# Patient Record
Sex: FEMALE | Race: WHITE | Employment: FULL TIME | URBAN - METROPOLITAN AREA
[De-identification: names, ages, dates, MRNs, and addresses within clinical notes are randomized per-mention and may not be internally consistent; named-entity substitution may affect disease eponyms.]

---

## 2023-09-15 ENCOUNTER — OFFICE VISIT (OUTPATIENT)
Dept: PRIMARY CARE CLINIC | Age: 28
End: 2023-09-15
Payer: COMMERCIAL

## 2023-09-15 VITALS
TEMPERATURE: 97.3 F | BODY MASS INDEX: 47.09 KG/M2 | WEIGHT: 293 LBS | HEIGHT: 66 IN | HEART RATE: 61 BPM | DIASTOLIC BLOOD PRESSURE: 84 MMHG | SYSTOLIC BLOOD PRESSURE: 132 MMHG | OXYGEN SATURATION: 99 %

## 2023-09-15 DIAGNOSIS — E66.01 MORBID OBESITY WITH BMI OF 45.0-49.9, ADULT (HCC): ICD-10-CM

## 2023-09-15 DIAGNOSIS — F40.10 SOCIAL ANXIETY DISORDER: ICD-10-CM

## 2023-09-15 DIAGNOSIS — F43.10 PTSD (POST-TRAUMATIC STRESS DISORDER): ICD-10-CM

## 2023-09-15 DIAGNOSIS — R43.0 LOSS OF SENSE OF SMELL: ICD-10-CM

## 2023-09-15 DIAGNOSIS — E28.2 PCOS (POLYCYSTIC OVARIAN SYNDROME): ICD-10-CM

## 2023-09-15 DIAGNOSIS — F90.2 ATTENTION DEFICIT HYPERACTIVITY DISORDER (ADHD), COMBINED TYPE: Primary | ICD-10-CM

## 2023-09-15 LAB
ABSOLUTE IMMATURE GRANULOCYTE: <0.03 K/UL (ref 0–0.58)
ALBUMIN SERPL-MCNC: 4.3 G/DL (ref 3.5–5.2)
ALP BLD-CCNC: 52 U/L (ref 35–104)
ALT SERPL-CCNC: 22 U/L (ref 0–32)
ANION GAP SERPL CALCULATED.3IONS-SCNC: 13 MMOL/L (ref 7–16)
AST SERPL-CCNC: 24 U/L (ref 0–31)
BASOPHILS ABSOLUTE: 0.06 K/UL (ref 0–0.2)
BASOPHILS RELATIVE PERCENT: 1 % (ref 0–2)
BILIRUB SERPL-MCNC: 0.4 MG/DL (ref 0–1.2)
BILIRUBIN DIRECT: <0.2 MG/DL (ref 0–0.3)
BILIRUBIN, INDIRECT: NORMAL MG/DL (ref 0–1)
BUN BLDV-MCNC: 13 MG/DL (ref 6–20)
CALCIUM SERPL-MCNC: 8.9 MG/DL (ref 8.6–10.2)
CHLORIDE BLD-SCNC: 106 MMOL/L (ref 98–107)
CHOLESTEROL: 156 MG/DL
CO2: 20 MMOL/L (ref 22–29)
CREAT SERPL-MCNC: 0.8 MG/DL (ref 0.5–1)
EOSINOPHILS ABSOLUTE: 0.23 K/UL (ref 0.05–0.5)
EOSINOPHILS RELATIVE PERCENT: 3 % (ref 0–6)
GFR SERPL CREATININE-BSD FRML MDRD: >60 ML/MIN/1.73M2
GLUCOSE BLD-MCNC: 96 MG/DL (ref 74–99)
HBA1C MFR BLD: 6.3 % (ref 4–5.6)
HCT VFR BLD CALC: 44 % (ref 34–48)
HDLC SERPL-MCNC: 35 MG/DL
HEMOGLOBIN: 13.7 G/DL (ref 11.5–15.5)
IMMATURE GRANULOCYTES: 0 % (ref 0–5)
LDL CHOLESTEROL: 93 MG/DL
LYMPHOCYTES ABSOLUTE: 2.77 K/UL (ref 1.5–4)
LYMPHOCYTES RELATIVE PERCENT: 34 % (ref 20–42)
MCH RBC QN AUTO: 27.7 PG (ref 26–35)
MCHC RBC AUTO-ENTMCNC: 31.1 G/DL (ref 32–34.5)
MCV RBC AUTO: 89.1 FL (ref 80–99.9)
MONOCYTES ABSOLUTE: 0.52 K/UL (ref 0.1–0.95)
MONOCYTES RELATIVE PERCENT: 6 % (ref 2–12)
NEUTROPHILS ABSOLUTE: 4.6 K/UL (ref 1.8–7.3)
NEUTROPHILS RELATIVE PERCENT: 56 % (ref 43–80)
PDW BLD-RTO: 14.7 % (ref 11.5–15)
PLATELET # BLD: 389 K/UL (ref 130–450)
PMV BLD AUTO: 10 FL (ref 7–12)
POTASSIUM SERPL-SCNC: 4.4 MMOL/L (ref 3.5–5)
RBC # BLD: 4.94 M/UL (ref 3.5–5.5)
SODIUM BLD-SCNC: 139 MMOL/L (ref 132–146)
T4 FREE: 0.9 NG/DL (ref 0.9–1.7)
TOTAL PROTEIN: 7.2 G/DL (ref 6.4–8.3)
TRIGL SERPL-MCNC: 142 MG/DL
TSH SERPL DL<=0.05 MIU/L-ACNC: 5.05 UIU/ML (ref 0.27–4.2)
VITAMIN B-12: 489 PG/ML (ref 211–946)
VITAMIN D 25-HYDROXY: 22.1 NG/ML (ref 30–100)
VLDLC SERPL CALC-MCNC: 28 MG/DL
WBC # BLD: 8.2 K/UL (ref 4.5–11.5)

## 2023-09-15 PROCEDURE — 99204 OFFICE O/P NEW MOD 45 MIN: CPT | Performed by: FAMILY MEDICINE

## 2023-09-15 RX ORDER — LISDEXAMFETAMINE DIMESYLATE CAPSULES 30 MG/1
30 CAPSULE ORAL EVERY MORNING
Qty: 30 CAPSULE | Refills: 0 | Status: SHIPPED | OUTPATIENT
Start: 2023-09-15 | End: 2023-10-15

## 2023-09-15 RX ORDER — SERTRALINE HYDROCHLORIDE 25 MG/1
25 TABLET, FILM COATED ORAL DAILY
Qty: 30 TABLET | Refills: 5 | Status: SHIPPED | OUTPATIENT
Start: 2023-09-15

## 2023-09-15 SDOH — ECONOMIC STABILITY: FOOD INSECURITY: WITHIN THE PAST 12 MONTHS, YOU WORRIED THAT YOUR FOOD WOULD RUN OUT BEFORE YOU GOT MONEY TO BUY MORE.: NEVER TRUE

## 2023-09-15 SDOH — ECONOMIC STABILITY: INCOME INSECURITY: HOW HARD IS IT FOR YOU TO PAY FOR THE VERY BASICS LIKE FOOD, HOUSING, MEDICAL CARE, AND HEATING?: NOT VERY HARD

## 2023-09-15 SDOH — ECONOMIC STABILITY: HOUSING INSECURITY
IN THE LAST 12 MONTHS, WAS THERE A TIME WHEN YOU DID NOT HAVE A STEADY PLACE TO SLEEP OR SLEPT IN A SHELTER (INCLUDING NOW)?: NO

## 2023-09-15 SDOH — ECONOMIC STABILITY: FOOD INSECURITY: WITHIN THE PAST 12 MONTHS, THE FOOD YOU BOUGHT JUST DIDN'T LAST AND YOU DIDN'T HAVE MONEY TO GET MORE.: NEVER TRUE

## 2023-09-15 ASSESSMENT — PATIENT HEALTH QUESTIONNAIRE - PHQ9
5. POOR APPETITE OR OVEREATING: 2
8. MOVING OR SPEAKING SO SLOWLY THAT OTHER PEOPLE COULD HAVE NOTICED. OR THE OPPOSITE, BEING SO FIGETY OR RESTLESS THAT YOU HAVE BEEN MOVING AROUND A LOT MORE THAN USUAL: 1
2. FEELING DOWN, DEPRESSED OR HOPELESS: 1
SUM OF ALL RESPONSES TO PHQ QUESTIONS 1-9: 12
4. FEELING TIRED OR HAVING LITTLE ENERGY: 2
SUM OF ALL RESPONSES TO PHQ QUESTIONS 1-9: 12
10. IF YOU CHECKED OFF ANY PROBLEMS, HOW DIFFICULT HAVE THESE PROBLEMS MADE IT FOR YOU TO DO YOUR WORK, TAKE CARE OF THINGS AT HOME, OR GET ALONG WITH OTHER PEOPLE: 1
7. TROUBLE CONCENTRATING ON THINGS, SUCH AS READING THE NEWSPAPER OR WATCHING TELEVISION: 1
SUM OF ALL RESPONSES TO PHQ9 QUESTIONS 1 & 2: 2
SUM OF ALL RESPONSES TO PHQ QUESTIONS 1-9: 12
6. FEELING BAD ABOUT YOURSELF - OR THAT YOU ARE A FAILURE OR HAVE LET YOURSELF OR YOUR FAMILY DOWN: 2
3. TROUBLE FALLING OR STAYING ASLEEP: 2
9. THOUGHTS THAT YOU WOULD BE BETTER OFF DEAD, OR OF HURTING YOURSELF: 0
SUM OF ALL RESPONSES TO PHQ QUESTIONS 1-9: 12
1. LITTLE INTEREST OR PLEASURE IN DOING THINGS: 1

## 2023-09-15 ASSESSMENT — LIFESTYLE VARIABLES
HOW OFTEN DO YOU HAVE A DRINK CONTAINING ALCOHOL: MONTHLY OR LESS
HOW MANY STANDARD DRINKS CONTAINING ALCOHOL DO YOU HAVE ON A TYPICAL DAY: 3 OR 4

## 2023-09-15 ASSESSMENT — ENCOUNTER SYMPTOMS: SHORTNESS OF BREATH: 0

## 2023-09-15 NOTE — PROGRESS NOTES
9/15/23  Mari BATEMAN Utt : 1995 Sex: female  Age: 32 y.o. Assessment and Plan:  [id number] NFay Luke was seen today for establish care. Diagnoses and all orders for this visit:    Attention deficit hyperactivity disorder (ADHD), combined type  -     lisdexamfetamine (VYVANSE) 30 MG capsule; Take 1 capsule by mouth every morning for 30 days. Max Daily Amount: 30 mg  Restart Vyvanse     PCOS (polycystic ovarian syndrome)    Social anxiety disorder  -     sertraline (ZOLOFT) 25 MG tablet; Take 1 tablet by mouth daily  Restart, staggered     PTSD (post-traumatic stress disorder)  -     sertraline (ZOLOFT) 25 MG tablet; Take 1 tablet by mouth daily    Morbid obesity with BMI of 45.0-49.9, adult ([address])  -     Basic Metabolic Panel; Future  -     CBC with Auto Differential; Future  -     Lipid Panel; Future  -     Hepatic Function Panel; Future  -     TSH; Future  -     T4, Free; Future  -     Vitamin B12; Future  -     Hemoglobin A1C; Future  -     Vitamin D 25 Hydroxy; Future  Update labs  Further recommendations pending results      Return in about 4 weeks (around 10/13/2023). Chief Complaint   Patient presents with    Establish Care       HPI  Pt here to establish care   She was a former patient of mine in [facility], but last seen about 2018    Pt has not had her ADHD meds, which has also helped with her binge eating  Was on Vyvanse   Pt was also seeing psych and they started her on sertraline for social anxiety, PTSD  Also concerns for bipolar 3  She has been feeling poorly recently - waking up with impending doom  Has this every few months   Very frustrated still with weight gain, inability to lose  Is working significant hours, taking a lot of overtime at work as they are short staffed       PCOS   Hypothyroidism - Hashimoto's    Pt had COVID and lost her sense of smell/taste  Hasn't gotten this back    Problem list reviewed and updated in full with patient today as necessary.   A comprehensive ROS was

## 2023-09-21 ENCOUNTER — TELEPHONE (OUTPATIENT)
Dept: PRIMARY CARE CLINIC | Age: 28
End: 2023-09-21

## 2023-09-21 NOTE — TELEPHONE ENCOUNTER
I Left message for Nicky Langley that the PA for Vyvanse was approved. Approved today  PA Case: [id number], Status: Approved, Coverage Starts on: 9/21/2023 12:00:00 AM, Coverage Ends on: 9/20/2024    I spoke with [employer] [facility] they ran it but Co-pay is over $300. I asked that they let her know the co-pay and if she would like to have something else to let us know.

## 2023-09-21 NOTE — TELEPHONE ENCOUNTER
I called Mari to let her know the PA was started and just waiting on determination on the Vyvanse. Also her labs are in and she can call the office back for the results.

## 2023-09-28 ENCOUNTER — TELEPHONE (OUTPATIENT)
Dept: PRIMARY CARE CLINIC | Age: 28
End: 2023-09-28

## 2023-09-28 NOTE — TELEPHONE ENCOUNTER
Received letter from [facility] with approval for Vyvanse   PA Case: [id number], Status: Approved, Coverage Starts on: 9/21/2023 12:00:00 AM, Coverage Ends on: 9/20/2024 12:00:00 AM.

## 2023-11-03 ENCOUNTER — OFFICE VISIT (OUTPATIENT)
Dept: PRIMARY CARE CLINIC | Age: 28
End: 2023-11-03
Payer: COMMERCIAL

## 2023-11-03 VITALS
WEIGHT: 293 LBS | HEART RATE: 72 BPM | DIASTOLIC BLOOD PRESSURE: 82 MMHG | OXYGEN SATURATION: 98 % | SYSTOLIC BLOOD PRESSURE: 124 MMHG | RESPIRATION RATE: 18 BRPM | TEMPERATURE: 97.1 F | BODY MASS INDEX: 47.09 KG/M2 | HEIGHT: 66 IN

## 2023-11-03 DIAGNOSIS — E55.9 VITAMIN D DEFICIENCY: ICD-10-CM

## 2023-11-03 DIAGNOSIS — R10.31 RIGHT LOWER QUADRANT PAIN: ICD-10-CM

## 2023-11-03 DIAGNOSIS — R11.0 NAUSEA: ICD-10-CM

## 2023-11-03 DIAGNOSIS — F40.10 SOCIAL ANXIETY DISORDER: ICD-10-CM

## 2023-11-03 DIAGNOSIS — F90.2 ATTENTION DEFICIT HYPERACTIVITY DISORDER (ADHD), COMBINED TYPE: ICD-10-CM

## 2023-11-03 DIAGNOSIS — Z00.01 ENCOUNTER FOR GENERAL ADULT MEDICAL EXAMINATION WITH ABNORMAL FINDINGS: Primary | ICD-10-CM

## 2023-11-03 DIAGNOSIS — F43.10 PTSD (POST-TRAUMATIC STRESS DISORDER): ICD-10-CM

## 2023-11-03 PROCEDURE — 99395 PREV VISIT EST AGE 18-39: CPT | Performed by: FAMILY MEDICINE

## 2023-11-03 NOTE — PROGRESS NOTES
11/3/23  Mari BATEMAN [facility] : 1995 Sex: female  Age: 32 y.o. Assessment and Plan:  Dennie Fontana was seen today for follow-up and annual exam.    Diagnoses and all orders for this visit:    Encounter for general adult medical examination with abnormal findings    Attention deficit hyperactivity disorder (ADHD), combined type  -     lisdexamfetamine (VYVANSE) 30 MG capsule; Take 1 capsule by mouth every morning for 30 days. Max Daily Amount: 30 mg  Well controlled. Cont current treatment plan as documented below. Social anxiety disorder  -     sertraline (ZOLOFT) 25 MG tablet; Take 1 tablet by mouth daily  Well controlled. Cont current treatment plan as documented below. PTSD (post-traumatic stress disorder)  -     sertraline (ZOLOFT) 25 MG tablet; Take 1 tablet by mouth daily  Well controlled. Cont current treatment plan as documented below. Right lower quadrant pain  -     Urinalysis; Future  -     Culture, Urine; Future  -     US PELVIS COMPLETE; Future  -     US NON OB TRANSVAGINAL; Future  Check UA/UCx; check pelvic US  Will need updated PAP smear too   Further recommendations pending results    Nausea  -     ondansetron (ZOFRAN-ODT) 4 MG disintegrating tablet; Take 1 tablet by mouth 3 times daily as needed for Nausea or Vomiting    Vitamin D deficiency    Other orders  -     Culture, Urine  -     Urinalysis      Return in about 3 months (around 2/3/2024).         Chief Complaint   Patient presents with    Follow-up    Annual Exam       HPI  Pt here for routine f/u and annual exam     Started back on Vyvanse and sertraline at her last OV   Doing well and feels good back on these meds  Would like to cont  Feels doses are appropriate, no need for changes at this time     Pt is noting nausea in the morning  Lasts maybe and hour or so and then she's ok   X a couple of weeks   Denies any chance of pregnancy     She has been noting RLQ pain over the past few days   Has been all day   Hurts with moving, like

## 2023-11-04 LAB
BILIRUBIN URINE: NEGATIVE
COLOR: YELLOW
COMMENT: NORMAL
GLUCOSE URINE: NEGATIVE MG/DL
KETONES, URINE: NEGATIVE MG/DL
LEUKOCYTE ESTERASE, URINE: NEGATIVE
NITRITE, URINE: NEGATIVE
PH UA: 6 (ref 5–9)
PROTEIN UA: NEGATIVE MG/DL
SPECIFIC GRAVITY UA: 1.02 (ref 1–1.03)
TURBIDITY: CLEAR
URINE HGB: NEGATIVE
UROBILINOGEN, URINE: 0.2 EU/DL (ref 0–1)

## 2023-11-04 RX ORDER — SERTRALINE HYDROCHLORIDE 25 MG/1
25 TABLET, FILM COATED ORAL DAILY
Qty: 90 TABLET | Refills: 1 | Status: SHIPPED | OUTPATIENT
Start: 2023-11-04

## 2023-11-04 RX ORDER — LISDEXAMFETAMINE DIMESYLATE CAPSULES 30 MG/1
30 CAPSULE ORAL EVERY MORNING
Qty: 30 CAPSULE | Refills: 0 | Status: SHIPPED | OUTPATIENT
Start: 2023-11-04 | End: 2023-12-04

## 2023-11-04 RX ORDER — ONDANSETRON 4 MG/1
4 TABLET, ORALLY DISINTEGRATING ORAL 3 TIMES DAILY PRN
Qty: 21 TABLET | Refills: 0 | Status: SHIPPED | OUTPATIENT
Start: 2023-11-04

## 2023-11-05 LAB
CULTURE: NORMAL
CULTURE: NORMAL
SPECIMEN DESCRIPTION: NORMAL

## 2023-11-06 ENCOUNTER — HOSPITAL ENCOUNTER (OUTPATIENT)
Dept: ULTRASOUND IMAGING | Age: 28
Discharge: HOME | End: 2023-11-08
Payer: COMMERCIAL

## 2023-11-06 DIAGNOSIS — R10.31 RIGHT LOWER QUADRANT PAIN: ICD-10-CM

## 2023-11-06 PROCEDURE — 76830 TRANSVAGINAL US NON-OB: CPT

## 2023-11-06 PROCEDURE — 76856 US EXAM PELVIC COMPLETE: CPT

## 2023-11-14 DIAGNOSIS — R10.31 RIGHT LOWER QUADRANT PAIN: Primary | ICD-10-CM

## 2023-11-14 DIAGNOSIS — E28.2 PCOS (POLYCYSTIC OVARIAN SYNDROME): ICD-10-CM

## 2023-11-27 ENCOUNTER — PATIENT MESSAGE (OUTPATIENT)
Dept: PRIMARY CARE CLINIC | Age: 28
End: 2023-11-27

## 2023-12-01 NOTE — TELEPHONE ENCOUNTER
[facility] and they are going to call who results them. They were both done and they're not sure why the result isn't in there.

## 2024-02-08 NOTE — PROGRESS NOTES
General: No focal deficit present.      Mental Status: She is alert and oriented to person, place, and time.   Psychiatric:         Mood and Affect: Mood normal.         Behavior: Behavior normal.          Counseled patient as appropriate and relevant regarding above diagnosis, including possible risks and complications, especially if left uncontrolled.  Counseled patient as appropriate and relevant regarding any  possible side effects, risks, and alternatives to treatment; patient and/or guardian verbalizes understanding, and is in agreement with the plan as detailed above.      Reviewed age and gender appropriate health screening exams and vaccinations.  Advised patient regarding importance of keeping up with recommended health maintenance and to schedule as soon as possible if overdue, as this is important in assessing for undiagnosed pathology, especially cancer, as well as protecting against potentially harmful/life threatening disease.      If discussed, any educational materials and/or home exercises printed for patient's review and were included in patient instructions on his/her After Visit Summary and given to patient at the end of visit.      Advised patient to call with any new medication issues, and and other concerns/complaints prior to scheduled follow up.  All questions answered to the patient's satisfaction.        Seen By:  Kassy Linares MD

## 2024-02-09 ENCOUNTER — OFFICE VISIT (OUTPATIENT)
Dept: PRIMARY CARE CLINIC | Age: 29
End: 2024-02-09
Payer: COMMERCIAL

## 2024-02-09 VITALS
DIASTOLIC BLOOD PRESSURE: 78 MMHG | HEART RATE: 55 BPM | WEIGHT: 293 LBS | BODY MASS INDEX: 47.09 KG/M2 | TEMPERATURE: 97.3 F | OXYGEN SATURATION: 97 % | SYSTOLIC BLOOD PRESSURE: 124 MMHG | HEIGHT: 66 IN

## 2024-02-09 DIAGNOSIS — E66.01 MORBID OBESITY WITH BMI OF 45.0-49.9, ADULT (HCC): ICD-10-CM

## 2024-02-09 DIAGNOSIS — F43.10 PTSD (POST-TRAUMATIC STRESS DISORDER): ICD-10-CM

## 2024-02-09 DIAGNOSIS — F90.2 ATTENTION DEFICIT HYPERACTIVITY DISORDER (ADHD), COMBINED TYPE: ICD-10-CM

## 2024-02-09 DIAGNOSIS — F40.10 SOCIAL ANXIETY DISORDER: ICD-10-CM

## 2024-02-09 DIAGNOSIS — R00.2 PALPITATIONS: Primary | ICD-10-CM

## 2024-02-09 PROCEDURE — 93000 ELECTROCARDIOGRAM COMPLETE: CPT | Performed by: FAMILY MEDICINE

## 2024-02-09 PROCEDURE — 99214 OFFICE O/P EST MOD 30 MIN: CPT | Performed by: FAMILY MEDICINE

## 2024-02-09 RX ORDER — LISDEXAMFETAMINE DIMESYLATE CAPSULES 30 MG/1
30 CAPSULE ORAL EVERY MORNING
Qty: 30 CAPSULE | Refills: 0 | Status: SHIPPED | OUTPATIENT
Start: 2024-02-09 | End: 2024-03-10

## 2024-02-09 RX ORDER — OXYCODONE HYDROCHLORIDE AND ACETAMINOPHEN 5; 325 MG/1; MG/1
1 TABLET ORAL EVERY 6 HOURS PRN
COMMUNITY
Start: 2024-01-25

## 2024-02-09 RX ORDER — IBUPROFEN 800 MG/1
800 TABLET ORAL EVERY 6 HOURS PRN
COMMUNITY
Start: 2024-01-25

## 2024-02-09 RX ORDER — SERTRALINE HYDROCHLORIDE 25 MG/1
25 TABLET, FILM COATED ORAL DAILY
Qty: 90 TABLET | Refills: 1 | Status: SHIPPED | OUTPATIENT
Start: 2024-02-09

## 2024-02-09 RX ORDER — TAMSULOSIN HYDROCHLORIDE 0.4 MG/1
0.4 CAPSULE ORAL DAILY
COMMUNITY
Start: 2024-01-25 | End: 2024-02-09 | Stop reason: ALTCHOICE

## 2024-02-09 ASSESSMENT — PATIENT HEALTH QUESTIONNAIRE - PHQ9
2. FEELING DOWN, DEPRESSED OR HOPELESS: 2
5. POOR APPETITE OR OVEREATING: 1
SUM OF ALL RESPONSES TO PHQ QUESTIONS 1-9: 8
9. THOUGHTS THAT YOU WOULD BE BETTER OFF DEAD, OR OF HURTING YOURSELF: 0
SUM OF ALL RESPONSES TO PHQ9 QUESTIONS 1 & 2: 3
7. TROUBLE CONCENTRATING ON THINGS, SUCH AS READING THE NEWSPAPER OR WATCHING TELEVISION: 1
1. LITTLE INTEREST OR PLEASURE IN DOING THINGS: 1
3. TROUBLE FALLING OR STAYING ASLEEP: 2
SUM OF ALL RESPONSES TO PHQ QUESTIONS 1-9: 8
6. FEELING BAD ABOUT YOURSELF - OR THAT YOU ARE A FAILURE OR HAVE LET YOURSELF OR YOUR FAMILY DOWN: 0
10. IF YOU CHECKED OFF ANY PROBLEMS, HOW DIFFICULT HAVE THESE PROBLEMS MADE IT FOR YOU TO DO YOUR WORK, TAKE CARE OF THINGS AT HOME, OR GET ALONG WITH OTHER PEOPLE: 0
8. MOVING OR SPEAKING SO SLOWLY THAT OTHER PEOPLE COULD HAVE NOTICED. OR THE OPPOSITE, BEING SO FIGETY OR RESTLESS THAT YOU HAVE BEEN MOVING AROUND A LOT MORE THAN USUAL: 0
4. FEELING TIRED OR HAVING LITTLE ENERGY: 1
SUM OF ALL RESPONSES TO PHQ QUESTIONS 1-9: 8
SUM OF ALL RESPONSES TO PHQ QUESTIONS 1-9: 8

## 2024-03-07 ENCOUNTER — OFFICE VISIT (OUTPATIENT)
Dept: PRIMARY CARE CLINIC | Age: 29
End: 2024-03-07
Payer: COMMERCIAL

## 2024-03-07 VITALS
SYSTOLIC BLOOD PRESSURE: 108 MMHG | DIASTOLIC BLOOD PRESSURE: 70 MMHG | RESPIRATION RATE: 17 BRPM | BODY MASS INDEX: 46.83 KG/M2 | WEIGHT: 291.4 LBS | HEIGHT: 66 IN | OXYGEN SATURATION: 98 % | HEART RATE: 51 BPM

## 2024-03-07 DIAGNOSIS — R10.10 UPPER ABDOMINAL PAIN: ICD-10-CM

## 2024-03-07 DIAGNOSIS — K21.9 GASTROESOPHAGEAL REFLUX DISEASE WITHOUT ESOPHAGITIS: Primary | ICD-10-CM

## 2024-03-07 PROCEDURE — 99214 OFFICE O/P EST MOD 30 MIN: CPT | Performed by: FAMILY MEDICINE

## 2024-03-07 RX ORDER — DICYCLOMINE HYDROCHLORIDE 10 MG/1
10 CAPSULE ORAL DAILY PRN
COMMUNITY
Start: 2024-03-01

## 2024-03-07 RX ORDER — TAMSULOSIN HYDROCHLORIDE 0.4 MG/1
0.4 CAPSULE ORAL DAILY
COMMUNITY
Start: 2024-03-01

## 2024-03-20 ENCOUNTER — TELEPHONE (OUTPATIENT)
Dept: SURGERY | Age: 29
End: 2024-03-20

## 2024-03-20 ENCOUNTER — OFFICE VISIT (OUTPATIENT)
Dept: SURGERY | Age: 29
End: 2024-03-20
Payer: COMMERCIAL

## 2024-03-20 VITALS
WEIGHT: 282 LBS | TEMPERATURE: 98 F | BODY MASS INDEX: 45.32 KG/M2 | DIASTOLIC BLOOD PRESSURE: 87 MMHG | SYSTOLIC BLOOD PRESSURE: 115 MMHG | HEART RATE: 94 BPM | HEIGHT: 66 IN

## 2024-03-20 DIAGNOSIS — R11.0 NAUSEA: ICD-10-CM

## 2024-03-20 DIAGNOSIS — R10.11 RUQ PAIN: Primary | ICD-10-CM

## 2024-03-20 DIAGNOSIS — R11.0 CHRONIC NAUSEA: ICD-10-CM

## 2024-03-20 PROBLEM — R19.7 DIARRHEA: Status: ACTIVE | Noted: 2024-03-20

## 2024-03-20 PROBLEM — R11.10 VOMITING: Status: ACTIVE | Noted: 2024-03-20

## 2024-03-20 PROCEDURE — 99203 OFFICE O/P NEW LOW 30 MIN: CPT | Performed by: SURGERY

## 2024-03-20 NOTE — PATIENT INSTRUCTIONS
General Surgery     Preoperative Instructions    Please read the following information very carefully. It contains information that is necessary to best prepare you for your upcoming procedure.    Make arrangements for a  to take you to and from your procedure.  Nothing to eat or drink after midnight the night before your procedure.  Follow your bowel prep instructions if you have them for this procedure.    3 days prior to your procedure: Stop taking blood thinners like Coumadin or Plavix or Xarelto.  5 days prior to your procedure: Stop taking Aspirin or Aspirin containing products.   If you cannot stop any of these medications prior to your procedure, please contact our office.    Medications morning of procedure:  Only heart, breathing, blood pressure, and seizure medications are permitted on the morning of your procedure. These medications can be taken with a sip of water.    IF YOU ARE UNABLE TO KEEP THE ABOVE SCHEDULED PROCEDURE, YOU MUST NOTIFY DR. CORTEZ'S OFFICE [phone]. NOT THE FACILITY.    NO CHEWING GUM OR CHEWING TOBACCO AFTER MIDNIGHT ON DAY OF PROCEDURE.    YOU MUST HAVE TRANSPORTATION TO AND FROM THE FACILITY.

## 2024-03-20 NOTE — PROGRESS NOTES
needed      oxyCODONE-acetaminophen (PERCOCET) 5-325 MG per tablet Take 1 tablet by mouth every 6 hours as needed for Pain.      sertraline (ZOLOFT) 25 MG tablet Take 1 tablet by mouth daily 90 tablet 1    lisdexamfetamine (VYVANSE) 30 MG capsule Take 1 capsule by mouth every morning for 30 days. Max Daily Amount: 30 mg 30 capsule 0    ondansetron (ZOFRAN-ODT) 4 MG disintegrating tablet Take 1 tablet by mouth 3 times daily as needed for Nausea or Vomiting 21 tablet 0    dicyclomine (BENTYL) 10 MG capsule Take 1 capsule by mouth daily as needed (Patient not taking: Reported on 3/20/2024)       No current facility-administered medications for this visit.         Social History:   Social History     Tobacco Use    Smoking status: Never     Passive exposure: Never    Smokeless tobacco: Never   Substance Use Topics    Alcohol use: Yes     Alcohol/week: 2.0 standard drinks of alcohol     Types: 2 Shots of liquor per week        Family History:   Family History   Problem Relation Age of Onset    Allergy (Severe) Mother     High Blood Pressure Mother     Mental Illness Mother     Diabetes Father     Heart Attack Father     Heart Disease Father     High Cholesterol Father     Kidney Disease Father     Mental Illness Sister        REVIEW OF SYSTEMS:    Constitutional: negative  Eyes: negative  Ears, nose, mouth, throat, and face: negative  Respiratory: negative  Cardiovascular: negative  Gastrointestinal: as in HPI  Genitourinary:negative  Integument/breast: negative  Hematologic/lymphatic: negative  Musculoskeletal:negative  Neurological: negative  Allergic/Immunologic: negative    PHYSICAL EXAM   /87 (Site: Left Upper Arm, Position: Sitting, Cuff Size: Large Adult)   Pulse 94   Temp 98 °F (36.7 °C) (Temporal)   Ht 1.676 m (5' 6\")   Wt 127.9 kg (282 lb)   BMI 45.52 kg/m²     General appearance: alert, cooperative and in no acute distress.  Eyes: Grossly normal   Lungs: Normal work of breathing  Heart: regular

## 2024-03-20 NOTE — TELEPHONE ENCOUNTER
Prior Authorization Form:      DEMOGRAPHICS:                     Patient Name:  Mari Rochet  Patient :  1995            Insurance:  Payor: BCBS / Plan: [place] PPO / Product Type: *No Product type* /   Insurance ID Number:    Payer/Plan Subscr  Sex Relation Sub. Ins. ID Effective Group Num   1. BCBS - BCBS -* UTT,MARI BATEMAN 1995 Female Self [id number] 10/1/22 [id number]                                   [id number]         DIAGNOSIS & PROCEDURE:                       Procedure/Operation: EGD           CPT Code: 66886    Diagnosis:  RUQ pain, nausea, vomiting, diarrhea     ICD10 Code: R10.11, R11.0, R11.10, R19.7    Location:  [place]     Surgeon:  Nicholas    SCHEDULING INFORMATION:                          Date: 24    Time: 1230p              Anesthesia:  MAC/TIVA                                                       Status:  Outpatient        Special Comments:         Electronically signed by Mona Christine LPN on 3/20/2024 at 2:44 PM auth

## 2024-03-21 NOTE — PROGRESS NOTES
[facility] PRE-ADMISSION TESTING INSTRUCTIONS    The Preadmission Testing patient is instructed accordingly using the following criteria (check applicable):    ARRIVAL INSTRUCTIONS:  [x] Parking the day of Surgery is located in the Main Entrance lot.  Upon entering the door, make an immediate right to the surgery reception desk    [x] Bring photo ID and insurance card    [] Bring in a copy of Living will or Durable Power of  papers.    [x] Please be sure to arrange for a responsible adult to provide transportation to and from the hospital    [x] Please arrange for a responsible adult to be with you for the 24 hour period post procedure due to having anesthesia    [x] If you awake am of surgery not feeling well or have temperature >100 please call [phone]    GENERAL INSTRUCTIONS:    [x] No solid foods after midnight, may have up to 8oz of water until 4 hours prior to surgery. No gum, no candy, no mints. NPO time:0830       [x] You may brush your teeth, do not swallow any toothpaste    [x] Take medications as instructed     [] Stop herbal supplements and vitamins 5 days prior to procedure    [] Follow preop dosing of blood thinners per physician instructions    [] Take 1/2 dose of evening insulin, but no insulin after midnight    [] No oral diabetic medications after midnight    [] If diabetic and have low blood sugar or feel symptomatic, take 1-2oz apple juice only    [] Bring inhalers day of surgery    [x] Bring urine specimen day of surgery    [x] Shower or bath with soap, lather and rinse well, AM of Surgery, no lotion, powders or creams to surgical site    [] Follow bowel prep as instructed per surgeon    [x] No tobacco products within 24 hours of surgery     [x] No alcohol or illegal drug use, marijuana included, within 24 hours of surgery.    [x] Jewelry, body piercing's, eyeglasses, contact lenses and dentures are not permitted into surgery (bring cases)      [x] Please

## 2024-03-22 ENCOUNTER — ANESTHESIA (OUTPATIENT)
Dept: ENDOSCOPY | Age: 29
End: 2024-03-22
Payer: COMMERCIAL

## 2024-03-22 ENCOUNTER — ANESTHESIA EVENT (OUTPATIENT)
Dept: ENDOSCOPY | Age: 29
End: 2024-03-22
Payer: COMMERCIAL

## 2024-03-22 ENCOUNTER — HOSPITAL ENCOUNTER (OUTPATIENT)
Age: 29
Setting detail: OUTPATIENT SURGERY
Discharge: HOME | End: 2024-03-22
Attending: SURGERY | Admitting: SURGERY
Payer: COMMERCIAL

## 2024-03-22 VITALS
OXYGEN SATURATION: 96 % | SYSTOLIC BLOOD PRESSURE: 121 MMHG | HEIGHT: 66 IN | BODY MASS INDEX: 45.32 KG/M2 | HEART RATE: 1 BPM | DIASTOLIC BLOOD PRESSURE: 75 MMHG | WEIGHT: 282 LBS | TEMPERATURE: 97 F | RESPIRATION RATE: 18 BRPM

## 2024-03-22 DIAGNOSIS — R10.11 RUQ PAIN: ICD-10-CM

## 2024-03-22 DIAGNOSIS — R11.0 NAUSEA: ICD-10-CM

## 2024-03-22 DIAGNOSIS — R11.10 VOMITING: ICD-10-CM

## 2024-03-22 DIAGNOSIS — R19.7 DIARRHEA: ICD-10-CM

## 2024-03-22 LAB
HCG, URINE, POC: NEGATIVE
Lab: NORMAL
NEGATIVE QC PASS/FAIL: NORMAL
POSITIVE QC PASS/FAIL: NORMAL

## 2024-03-22 PROCEDURE — 3700000001 HC ADD 15 MINUTES (ANESTHESIA): Performed by: SURGERY

## 2024-03-22 PROCEDURE — 88342 IMHCHEM/IMCYTCHM 1ST ANTB: CPT

## 2024-03-22 PROCEDURE — 6360000002 HC RX W HCPCS: Performed by: NURSE ANESTHETIST, CERTIFIED REGISTERED

## 2024-03-22 PROCEDURE — 3700000000 HC ANESTHESIA ATTENDED CARE: Performed by: SURGERY

## 2024-03-22 PROCEDURE — 3609012400 HC EGD TRANSORAL BIOPSY SINGLE/MULTIPLE: Performed by: SURGERY

## 2024-03-22 PROCEDURE — 7100000011 HC PHASE II RECOVERY - ADDTL 15 MIN: Performed by: SURGERY

## 2024-03-22 PROCEDURE — 7100000010 HC PHASE II RECOVERY - FIRST 15 MIN: Performed by: SURGERY

## 2024-03-22 PROCEDURE — 88305 TISSUE EXAM BY PATHOLOGIST: CPT

## 2024-03-22 PROCEDURE — 43239 EGD BIOPSY SINGLE/MULTIPLE: CPT | Performed by: SURGERY

## 2024-03-22 PROCEDURE — 2580000003 HC RX 258: Performed by: NURSE ANESTHETIST, CERTIFIED REGISTERED

## 2024-03-22 PROCEDURE — 2709999900 HC NON-CHARGEABLE SUPPLY: Performed by: SURGERY

## 2024-03-22 RX ORDER — SODIUM CHLORIDE 9 MG/ML
INJECTION, SOLUTION INTRAVENOUS CONTINUOUS PRN
Status: DISCONTINUED | OUTPATIENT
Start: 2024-03-22 | End: 2024-03-22 | Stop reason: SDUPTHER

## 2024-03-22 RX ORDER — PROPOFOL 10 MG/ML
INJECTION, EMULSION INTRAVENOUS PRN
Status: DISCONTINUED | OUTPATIENT
Start: 2024-03-22 | End: 2024-03-22 | Stop reason: SDUPTHER

## 2024-03-22 RX ADMIN — PROPOFOL 200 MG: 10 INJECTION, EMULSION INTRAVENOUS at 11:33

## 2024-03-22 RX ADMIN — SODIUM CHLORIDE: 9 INJECTION, SOLUTION INTRAVENOUS at 11:32

## 2024-03-22 RX ADMIN — PROPOFOL 2 MG: 10 INJECTION, EMULSION INTRAVENOUS at 11:05

## 2024-03-22 RX ADMIN — SODIUM CHLORIDE: 9 INJECTION, SOLUTION INTRAVENOUS at 11:23

## 2024-03-22 ASSESSMENT — PAIN - FUNCTIONAL ASSESSMENT: PAIN_FUNCTIONAL_ASSESSMENT: 0-10

## 2024-03-22 ASSESSMENT — LIFESTYLE VARIABLES: SMOKING_STATUS: 0

## 2024-03-22 NOTE — H&P
Patient's office history and physical was reviewed.    Patient examined.    There has been no change in the patient's history and physical.      Physician Signature: Electronically signed by Dr. Fany Peterson    History and Physical - General Surgery    Patient's Name/Date of Birth: Mari Smith / 1995    Date: 3/20/2024    PCP: Kassy Linares MD    Referring Physician:   No ref. provider found  N/A      CHIEF COMPLAINT:    No chief complaint on file.        HISTORY OF PRESENT ILLNESS:    Mari Smith is an 28 y.o. female who presents with nausea, vomiting and diarrhea. She said she was having abdominal pain as well. She went to the ER and had a CT that showed kidney stones. She said she had about 6 days of n/v/d/abdominal pain. She said it starts in her epigastrium and radiates to her bilateral upper quadrants. She saw a urologist that said it was not the stones. She is on omeprazole OTC for a while. She said she has had a lot GI issues over the years. She has been to the ER and a GI cocktail has seemed to help. She said her abdominal pain feels like it is squeezing her insides.  She has had these symptoms off and on for ten years. She had a gallbladder workup over ten years ago and said at one point her EF was 91%. This was in 2014. She said she is sticking to a low fat diet. She said her symptoms are worsening to the point where she is having a lot of trouble eating most things. No NSAID, EtOH or caffeine usage.       Past Medical History:   Past Medical History:   Diagnosis Date    Abdominal pain     ADHD (attention deficit hyperactivity disorder)     Eczema     Hypothyroidism     Irritable bowel syndrome     Kidney stones         Past Surgical History: History reviewed. No pertinent surgical history.     Allergies: Egg-derived products, Carbinoxamine maleate, Other, Pseudoephedrine hcl, Rondec-d [chlophedianol-pseudoephedrine], and Chicken (grilled) flavor [chicken flavor]     Medications:   No

## 2024-03-22 NOTE — DISCHARGE INSTRUCTIONS
[facility] EGD PROCEDURE DISCHARGE INSTRUCTIONS  You may be drowsy or lightheaded after receiving sedation or anesthesia.    A responsible person should be with you for the next 24 hours.    Please follow the instructions checked below:    DIET INSTRUCTIONS:  [x]Start with light diet and progress to your normal diet as you feel like eating. If you experience nausea or repeated episodes of vomiting which persist beyond 12-24 hours, notify your doctor.  []Other     ACTIVITY INSTRUCTIONS:  [x]Rest today. Increase activity as tolerated    []No heavy lifting or strenuous activity     [x]No driving for today  []Other      MEDICATION INSTRUCTIONS:    []Prescriptions sent with you.  Use as directed.  When taking pain medications, you may experience dizziness or drowsiness.  Do not drink alcohol or drive when taking these medications.  [x]Continue preop medications                               Post-procedure Care   If any tissue was removed:   It will be sent to a lab to be examined. It may take 1-2 weeks for results. The doctor will usually give an initial report after the scope is removed. Other tests may be recommended.   A small amount of bleeding may occur during the first few days after the procedure.     When you return home after the procedure, be sure to follow your doctor's instructions, which may include:   Resume medicines as instructed by your doctor.   Resume normal diet, unless directed otherwise by your doctor.   The sedative will make you drowsy. Avoid driving, operating machinery, or making important decisions for the rest of the day.   Rest for the remainder of the day.   Rest when you get home.   Avoid alcohol for the rest of the day.   Be sure to follow your doctor's instructions .     Results   This test gives your doctor information about the health of your digestive system. The results can help to explain your symptoms. You and your doctor will talk about the results and your  treatment plan.     Call Your Doctor   After the test, call your doctor if any of the following occurs:   Signs of infection, including fever and chills   Severe abdominal pain   Hard, swollen abdomen   Difficulty swallowing or breathing   Any change or increase in your original symptoms   Bloody or black tarry colored stools   Nausea and/or vomiting   Cough, shortness of breath, or chest pain   Bleeding     In case of emergency, call 911.        FOLLOW-UP CARE:  [x]Call the office at [phone] for follow-up appointment in 1 week

## 2024-03-22 NOTE — ANESTHESIA PRE PROCEDURE
Department of Anesthesiology  Preprocedure Note       Name:  Mari Smith   Age:  28 y.o.  :  1995                                          MRN:  [id number]         Date:  3/22/2024      Surgeon: Surgeon(s):  Fany Peterson MD    Procedure: Procedure(s):  ESOPHAGOGASTRODUODENOSCOPY BIOPSY    Medications prior to admission:   Prior to Admission medications    Medication Sig Start Date End Date Taking? Authorizing Provider   dicyclomine (BENTYL) 10 MG capsule Take 1 capsule by mouth daily as needed  Patient not taking: Reported on 3/20/2024 3/1/24   Olu Ruiz MD   tamsulosin (FLOMAX) 0.4 MG capsule Take 1 capsule by mouth daily  Patient not taking: Reported on 3/21/2024 3/1/24   Olu Ruiz MD    MG tablet Take 1 tablet by mouth every 6 hours as needed 24   Olu Ruiz MD   oxyCODONE-acetaminophen (PERCOCET) 5-325 MG per tablet Take 1 tablet by mouth every 6 hours as needed for Pain.  Patient not taking: Reported on 3/21/2024 1/25/24   Olu Ruiz MD   sertraline (ZOLOFT) 25 MG tablet Take 1 tablet by mouth daily 24   Kassy Linares MD   lisdexamfetamine (VYVANSE) 30 MG capsule Take 1 capsule by mouth every morning for 30 days. Max Daily Amount: 30 mg 2/9/24 3/21/24  Kassy Linares MD   ondansetron (ZOFRAN-ODT) 4 MG disintegrating tablet Take 1 tablet by mouth 3 times daily as needed for Nausea or Vomiting 23   Kassy Linares MD       Current medications:    No current facility-administered medications for this encounter.       Allergies:    Allergies   Allergen Reactions   • Egg-Derived Products Anaphylaxis   • Carbinoxamine Maleate    • Other      Rondex   • Pseudoephedrine Hcl    • Rondec-D [Chlophedianol-Pseudoephedrine]      Other reaction(s): Unknown   • Chicken (Grilled) Flavor [Chicken Flavor] Nausea And Vomiting       Problem List:    Patient Active Problem List   Diagnosis Code   • RUQ pain R10.11   • Nausea R11.0   •

## 2024-03-22 NOTE — OP NOTE
Operative Note: EGD    Mari BATEMAN Utt     DATE OF PROCEDURE: 3/22/2024  SURGEON: Dr. FANY PETERSON MD, M.D.     PREOPERATIVE DIAGNOSES:   RUQ pain  Nausea, vomiting  Diarrhea    POSTOPERATIVE DIAGNOSES:  R/o duodenitis    OPERATION:    EGD esophagogastroduodenoscopy with antral, duodenal, distal esophageal biopsies    SPECIMENS:  ID Type Source Tests Collected by Time Destination   A : duodenal bx Tissue Stomach SURGICAL PATHOLOGY Fany Peterson MD 3/22/2024 1134    B : antral bx Tissue Stomach SURGICAL PATHOLOGY Fany Peterson MD 3/22/2024 1134    C : distal esophagus bx Tissue Esophagus SURGICAL PATHOLOGY Fany Peterson MD 3/22/2024 1140        ANESTHESIA: LMAC    BLOOD LOSS: Minimal    CONSENT AND INDICATIONS:  This is a 28 y.o. year old female who is having the above. I have discussed with the patient and/or the patient representative the indication, alternatives, and the possible risks and/or complications of the planned procedure and the anesthesia methods. The patient and/or patient representative appear to understand and agree to proceed.      OPERATIONS: The patient was placed on the table and sedated by anesthesia. Bite block was placed. A lubricated scope was easily passed into the upper esophagus which looked normal. The distal esophagus looked normal and biopsies were taken. The gastroesophageal junction was at 38 cm. The scope was passed into the stomach and retroflexed. There was no hiatal hernia. The scope was passed down toward the pylorus. The antral mucosa all looked normal. Biopsy was taken. The scope was then passed through the pylorus into the duodenal bulb which looked normal, then around to the distal duodenum which looked abnormal: scattered white spots and biopsies were taken, and the scope was then withdrawn. The patient tolerated the procedure well.    PLAN: Follow up biopsies.  Gallbladder workup    Physician Signature: Electronically signed by

## 2024-03-22 NOTE — ANESTHESIA POSTPROCEDURE EVALUATION
Department of Anesthesiology  Postprocedure Note    Patient: Mari Smith  MRN: [id number]  YOB: 1995  Date of evaluation: 3/22/2024    Procedure Summary       Date: 03/22/24 Room / Location: [place] [address] / [facility]    Anesthesia Start: 1124 Anesthesia Stop: 1144    Procedure: ESOPHAGOGASTRODUODENOSCOPY BIOPSY Diagnosis:       RUQ pain      Nausea      Vomiting      Diarrhea      (RUQ pain [R10.11])      (Nausea [R11.0])      (Vomiting [R11.10])      (Diarrhea [R19.7])    Surgeons: Fany Peterson MD Responsible Provider: Yoandy Hoang Jr., MD    Anesthesia Type: MAC ASA Status: 2            Anesthesia Type: No value filed.    Izzy Phase I: Izzy Score: 10    Izzy Phase II:      Anesthesia Post Evaluation    No notable events documented.

## 2024-03-27 LAB — SURGICAL PATHOLOGY REPORT: NORMAL

## 2024-04-12 ENCOUNTER — TELEPHONE (OUTPATIENT)
Dept: SURGERY | Age: 29
End: 2024-04-12

## 2024-04-12 DIAGNOSIS — K83.8 COMMON BILE DUCT DILATATION: ICD-10-CM

## 2024-04-12 DIAGNOSIS — R10.11 RUQ ABDOMINAL PAIN: Primary | ICD-10-CM

## 2024-04-12 NOTE — TELEPHONE ENCOUNTER
Left a message for alvarez to return call her us gallbladder results are fine - she needs scheduled for a hida w/kinevac and a MRCP - both have been ordered - also left radiology number for pt to call and schedule

## 2024-04-19 ENCOUNTER — HOSPITAL ENCOUNTER (OUTPATIENT)
Dept: NUCLEAR MEDICINE | Age: 29
Discharge: HOME | End: 2024-04-19
Payer: COMMERCIAL

## 2024-04-19 VITALS — BODY MASS INDEX: 45.52 KG/M2 | WEIGHT: 282 LBS

## 2024-04-19 DIAGNOSIS — R10.11 RUQ ABDOMINAL PAIN: ICD-10-CM

## 2024-04-19 PROCEDURE — 6360000002 HC RX W HCPCS: Performed by: RADIOLOGY

## 2024-04-19 PROCEDURE — 3430000000 HC RX DIAGNOSTIC RADIOPHARMACEUTICAL: Performed by: RADIOLOGY

## 2024-04-19 PROCEDURE — A9537 TC99M MEBROFENIN: HCPCS | Performed by: RADIOLOGY

## 2024-04-19 PROCEDURE — 78227 HEPATOBIL SYST IMAGE W/DRUG: CPT

## 2024-04-19 PROCEDURE — 2580000003 HC RX 258: Performed by: RADIOLOGY

## 2024-04-19 RX ADMIN — SINCALIDE 2.56 MCG: 5 INJECTION, POWDER, LYOPHILIZED, FOR SOLUTION INTRAVENOUS at 11:14

## 2024-04-19 RX ADMIN — Medication 6 MILLICURIE: at 10:37

## 2024-04-23 ENCOUNTER — HOSPITAL ENCOUNTER (OUTPATIENT)
Dept: MRI IMAGING | Age: 29
Discharge: HOME | End: 2024-04-25
Attending: SURGERY
Payer: COMMERCIAL

## 2024-04-23 DIAGNOSIS — K83.8 COMMON BILE DUCT DILATATION: ICD-10-CM

## 2024-04-23 DIAGNOSIS — R10.11 RUQ ABDOMINAL PAIN: ICD-10-CM

## 2024-04-23 PROCEDURE — 74183 MRI ABD W/O CNTR FLWD CNTR: CPT

## 2024-04-23 PROCEDURE — 6360000004 HC RX CONTRAST MEDICATION: Performed by: RADIOLOGY

## 2024-04-23 PROCEDURE — A9579 GAD-BASE MR CONTRAST NOS,1ML: HCPCS | Performed by: RADIOLOGY

## 2024-04-23 RX ADMIN — GADOTERIDOL 20 ML: 279.3 INJECTION, SOLUTION INTRAVENOUS at 10:20

## 2024-05-12 DIAGNOSIS — F43.10 PTSD (POST-TRAUMATIC STRESS DISORDER): ICD-10-CM

## 2024-05-12 DIAGNOSIS — F90.2 ATTENTION DEFICIT HYPERACTIVITY DISORDER (ADHD), COMBINED TYPE: ICD-10-CM

## 2024-05-12 DIAGNOSIS — R11.0 NAUSEA: ICD-10-CM

## 2024-05-12 DIAGNOSIS — F40.10 SOCIAL ANXIETY DISORDER: ICD-10-CM

## 2024-05-13 RX ORDER — SERTRALINE HYDROCHLORIDE 25 MG/1
25 TABLET, FILM COATED ORAL DAILY
Qty: 90 TABLET | Refills: 1 | Status: SHIPPED | OUTPATIENT
Start: 2024-05-13

## 2024-05-13 RX ORDER — LISDEXAMFETAMINE DIMESYLATE 30 MG/1
30 CAPSULE ORAL EVERY MORNING
Qty: 30 CAPSULE | Refills: 0 | Status: SHIPPED | OUTPATIENT
Start: 2024-05-13 | End: 2024-06-12

## 2024-05-13 RX ORDER — ONDANSETRON 4 MG/1
4 TABLET, ORALLY DISINTEGRATING ORAL 3 TIMES DAILY PRN
Qty: 21 TABLET | Refills: 0 | Status: SHIPPED | OUTPATIENT
Start: 2024-05-13

## 2024-05-24 ENCOUNTER — OFFICE VISIT (OUTPATIENT)
Dept: PRIMARY CARE CLINIC | Age: 29
End: 2024-05-24
Payer: COMMERCIAL

## 2024-05-24 ENCOUNTER — PATIENT MESSAGE (OUTPATIENT)
Dept: PRIMARY CARE CLINIC | Age: 29
End: 2024-05-24

## 2024-05-24 VITALS
DIASTOLIC BLOOD PRESSURE: 72 MMHG | HEIGHT: 66 IN | WEIGHT: 281 LBS | BODY MASS INDEX: 45.16 KG/M2 | SYSTOLIC BLOOD PRESSURE: 112 MMHG | OXYGEN SATURATION: 97 % | HEART RATE: 58 BPM | TEMPERATURE: 96.9 F

## 2024-05-24 DIAGNOSIS — E66.01 CLASS 3 SEVERE OBESITY DUE TO EXCESS CALORIES WITHOUT SERIOUS COMORBIDITY WITH BODY MASS INDEX (BMI) OF 45.0 TO 49.9 IN ADULT (HCC): ICD-10-CM

## 2024-05-24 DIAGNOSIS — E66.01 CLASS 3 SEVERE OBESITY DUE TO EXCESS CALORIES WITHOUT SERIOUS COMORBIDITY WITH BODY MASS INDEX (BMI) OF 45.0 TO 49.9 IN ADULT (HCC): Primary | ICD-10-CM

## 2024-05-24 DIAGNOSIS — F90.2 ATTENTION DEFICIT HYPERACTIVITY DISORDER (ADHD), COMBINED TYPE: ICD-10-CM

## 2024-05-24 PROCEDURE — 99214 OFFICE O/P EST MOD 30 MIN: CPT | Performed by: FAMILY MEDICINE

## 2024-05-24 PROCEDURE — G2211 COMPLEX E/M VISIT ADD ON: HCPCS | Performed by: FAMILY MEDICINE

## 2024-05-24 RX ORDER — PHENTERMINE HYDROCHLORIDE 37.5 MG/1
37.5 TABLET ORAL
Qty: 30 TABLET | Refills: 0 | Status: SHIPPED | OUTPATIENT
Start: 2024-05-24 | End: 2024-06-23

## 2024-05-24 RX ORDER — PHENTERMINE HYDROCHLORIDE 37.5 MG/1
37.5 TABLET ORAL
Qty: 30 TABLET | Refills: 0 | Status: SHIPPED
Start: 2024-05-24 | End: 2024-05-24 | Stop reason: SDUPTHER

## 2024-05-24 RX ORDER — LISDEXAMFETAMINE DIMESYLATE 30 MG/1
30 CAPSULE ORAL EVERY MORNING
Qty: 30 CAPSULE | Refills: 0 | Status: SHIPPED | OUTPATIENT
Start: 2024-05-24 | End: 2024-06-23

## 2024-05-24 NOTE — PROGRESS NOTES
24  Mari BATEMAN Utt : 1995 Sex: female  Age: 28 y.o.      Assessment and Plan:  Mari was seen today for follow-up.    Diagnoses and all orders for this visit:    Class 3 severe obesity due to excess calories without serious comorbidity with body mass index (BMI) of 45.0 to 49.9 in adult (HCC)  -     Discontinue: phentermine (ADIPEX-P) 37.5 MG tablet; Take 1 tablet by mouth every morning (before breakfast) for 30 days. Max Daily Amount: 37.5 mg  Trial of adipex  Reassess 1 month     Attention deficit hyperactivity disorder (ADHD), combined type  -     lisdexamfetamine (VYVANSE) 30 MG capsule; Take 1 capsule by mouth every morning for 30 days. Max Daily Amount: 30 mg  Stable. Cont current treatment as documented below.      I have a longitudinal relationship with this patient and continued responsibility as the focal point for all needed services for his or her care.      Return in about 4 weeks (around 2024).        Chief Complaint   Patient presents with    Follow-up     galbladder       HPI  Pt here for routine f/u      Had MRCP and HIDA scan that were generally reassuring   Has f/u with Dr. Millard next week    Working on weight loss  Stall  x 4 weeks  Down about 20 lbs overall   She is watching diet, working out regularly     ADHD -   Hasn't been getting the Vyvanse regularly but it is helpful    Problem list reviewed and updated in full with patient today as necessary.  A comprehensive ROS was negative, except as documented above.       Current Outpatient Medications:     lisdexamfetamine (VYVANSE) 30 MG capsule, Take 1 capsule by mouth every morning for 30 days. Max Daily Amount: 30 mg, Disp: 30 capsule, Rfl: 0    ondansetron (ZOFRAN-ODT) 4 MG disintegrating tablet, Take 1 tablet by mouth 3 times daily as needed for Nausea or Vomiting, Disp: 21 tablet, Rfl: 0    sertraline (ZOLOFT) 25 MG tablet, Take 1 tablet by mouth daily, Disp: 90 tablet, Rfl: 1    triamcinolone (KENALOG) 0.1 % ointment,

## 2024-05-24 NOTE — TELEPHONE ENCOUNTER
From: Mari Smith  To: Dr. Kassy Linares  Sent: 5/24/2024 12:10 PM EDT  Subject: Adipex    [facility] does not carry the adipex.   I was wondering if she could send the prescription to [facility] pharmacy

## 2024-06-03 PROBLEM — E66.01 MORBID OBESITY (HCC): Status: ACTIVE | Noted: 2024-06-03

## 2024-06-12 ENCOUNTER — TELEPHONE (OUTPATIENT)
Dept: PRIMARY CARE CLINIC | Age: 29
End: 2024-06-12

## 2024-06-12 DIAGNOSIS — E66.01 CLASS 3 SEVERE OBESITY DUE TO EXCESS CALORIES WITHOUT SERIOUS COMORBIDITY WITH BODY MASS INDEX (BMI) OF 45.0 TO 49.9 IN ADULT (HCC): ICD-10-CM

## 2024-06-12 RX ORDER — PHENTERMINE HYDROCHLORIDE 37.5 MG/1
37.5 TABLET ORAL
Qty: 30 TABLET | Refills: 0 | Status: SHIPPED | OUTPATIENT
Start: 2024-06-12 | End: 2024-07-12

## 2024-06-12 NOTE — TELEPHONE ENCOUNTER
Last Appointment:  5/24/2024  Future Appointments   Date Time Provider Department Center   6/13/2024  2:00 PM Queenie Silvestre DO [facility]   6/21/2024  8:45 AM Kassy Linares MD [facility]      Patient wanting medication sent to a different pharmacy.  Already approved by Dr Linares 2x.

## 2024-07-17 DIAGNOSIS — F90.2 ATTENTION DEFICIT HYPERACTIVITY DISORDER (ADHD), COMBINED TYPE: ICD-10-CM

## 2024-07-17 DIAGNOSIS — F43.10 PTSD (POST-TRAUMATIC STRESS DISORDER): ICD-10-CM

## 2024-07-17 DIAGNOSIS — F40.10 SOCIAL ANXIETY DISORDER: ICD-10-CM

## 2024-07-18 RX ORDER — LISDEXAMFETAMINE DIMESYLATE 30 MG/1
30 CAPSULE ORAL EVERY MORNING
Qty: 30 CAPSULE | Refills: 0 | Status: SHIPPED | OUTPATIENT
Start: 2024-07-18 | End: 2024-08-17

## 2024-07-18 RX ORDER — SERTRALINE HYDROCHLORIDE 25 MG/1
25 TABLET, FILM COATED ORAL DAILY
Qty: 90 TABLET | Refills: 1 | Status: SHIPPED | OUTPATIENT
Start: 2024-07-18

## 2024-09-13 DIAGNOSIS — R11.0 NAUSEA: ICD-10-CM

## 2024-09-13 DIAGNOSIS — F90.2 ATTENTION DEFICIT HYPERACTIVITY DISORDER (ADHD), COMBINED TYPE: ICD-10-CM

## 2024-09-13 RX ORDER — LISDEXAMFETAMINE DIMESYLATE 30 MG/1
30 CAPSULE ORAL EVERY MORNING
Qty: 30 CAPSULE | Refills: 0 | Status: SHIPPED | OUTPATIENT
Start: 2024-09-13 | End: 2024-10-13

## 2024-09-13 RX ORDER — ONDANSETRON 4 MG/1
4 TABLET, ORALLY DISINTEGRATING ORAL 3 TIMES DAILY PRN
Qty: 21 TABLET | Refills: 0 | Status: SHIPPED | OUTPATIENT
Start: 2024-09-13

## 2024-11-27 ENCOUNTER — OFFICE VISIT (OUTPATIENT)
Dept: PRIMARY CARE CLINIC | Age: 29
End: 2024-11-27

## 2024-11-27 VITALS
WEIGHT: 264 LBS | HEIGHT: 65 IN | BODY MASS INDEX: 43.99 KG/M2 | DIASTOLIC BLOOD PRESSURE: 80 MMHG | HEART RATE: 79 BPM | OXYGEN SATURATION: 98 % | SYSTOLIC BLOOD PRESSURE: 124 MMHG | RESPIRATION RATE: 16 BRPM

## 2024-11-27 DIAGNOSIS — M54.16 LUMBAR BACK PAIN WITH RADICULOPATHY AFFECTING LOWER EXTREMITY: Primary | ICD-10-CM

## 2024-11-27 DIAGNOSIS — F43.10 PTSD (POST-TRAUMATIC STRESS DISORDER): ICD-10-CM

## 2024-11-27 DIAGNOSIS — M48.07 SPINAL STENOSIS OF LUMBOSACRAL REGION: ICD-10-CM

## 2024-11-27 DIAGNOSIS — F90.2 ATTENTION DEFICIT HYPERACTIVITY DISORDER (ADHD), COMBINED TYPE: ICD-10-CM

## 2024-11-27 DIAGNOSIS — M51.27 PROTRUSION OF INTERVERTEBRAL DISC OF LUMBOSACRAL REGION: ICD-10-CM

## 2024-11-27 DIAGNOSIS — F40.10 SOCIAL ANXIETY DISORDER: ICD-10-CM

## 2024-11-27 DIAGNOSIS — R11.0 NAUSEA: ICD-10-CM

## 2024-11-27 RX ORDER — PREDNISONE 10 MG/1
TABLET ORAL
Qty: 30 TABLET | Refills: 0 | Status: SHIPPED | OUTPATIENT
Start: 2024-11-27

## 2024-11-27 RX ORDER — TRIAMCINOLONE ACETONIDE 1 MG/G
OINTMENT TOPICAL
Qty: 30 G | Refills: 1 | Status: CANCELLED | OUTPATIENT
Start: 2024-11-27

## 2024-11-27 RX ORDER — ONDANSETRON 4 MG/1
4 TABLET, ORALLY DISINTEGRATING ORAL 3 TIMES DAILY PRN
Qty: 21 TABLET | Refills: 0 | Status: SHIPPED | OUTPATIENT
Start: 2024-11-27

## 2024-11-27 RX ORDER — SERTRALINE HYDROCHLORIDE 25 MG/1
25 TABLET, FILM COATED ORAL DAILY
Qty: 90 TABLET | Refills: 1 | Status: SHIPPED | OUTPATIENT
Start: 2024-11-27

## 2024-11-27 RX ORDER — CYCLOBENZAPRINE HCL 5 MG
5 TABLET ORAL 3 TIMES DAILY PRN
Qty: 30 TABLET | Refills: 0 | Status: SHIPPED | OUTPATIENT
Start: 2024-11-27 | End: 2024-12-07

## 2024-11-27 RX ORDER — LISDEXAMFETAMINE DIMESYLATE 30 MG/1
30 CAPSULE ORAL EVERY MORNING
Qty: 30 CAPSULE | Refills: 0 | Status: SHIPPED | OUTPATIENT
Start: 2024-11-27 | End: 2024-12-27

## 2024-11-27 SDOH — ECONOMIC STABILITY: FOOD INSECURITY: WITHIN THE PAST 12 MONTHS, THE FOOD YOU BOUGHT JUST DIDN'T LAST AND YOU DIDN'T HAVE MONEY TO GET MORE.: NEVER TRUE

## 2024-11-27 SDOH — ECONOMIC STABILITY: INCOME INSECURITY: HOW HARD IS IT FOR YOU TO PAY FOR THE VERY BASICS LIKE FOOD, HOUSING, MEDICAL CARE, AND HEATING?: NOT HARD AT ALL

## 2024-11-27 SDOH — ECONOMIC STABILITY: FOOD INSECURITY: WITHIN THE PAST 12 MONTHS, YOU WORRIED THAT YOUR FOOD WOULD RUN OUT BEFORE YOU GOT MONEY TO BUY MORE.: NEVER TRUE

## 2024-11-27 NOTE — PROGRESS NOTES
1    dicyclomine (BENTYL) 10 MG capsule, Take 1 capsule by mouth daily as needed, Disp: , Rfl:      MG tablet, Take 1 tablet by mouth every 6 hours as needed, Disp: , Rfl:     oxyCODONE-acetaminophen (PERCOCET) 5-325 MG per tablet, Take 1 tablet by mouth every 6 hours as needed for Pain., Disp: , Rfl:   Allergies   Allergen Reactions    Egg-Derived Products Anaphylaxis    Carbinoxamine Maleate     Carbinoxamine Maleate Other (See Comments)    Other      Rondex    Pseudoephedrine Hcl     Rondec-D [Chlophedianol-Pseudoephedrine]      Other reaction(s): Unknown    Chicken (Grilled) Flavor [Chicken Flavor] Nausea And Vomiting       Pt's past medical and surgical history were reviewed and updated as necessary today   Pt's family and social history were reviewed and updated as necessary today      Vitals:    11/27/24 1059   BP: 124/80   Pulse: 79   Resp: 16   SpO2: 98%   Weight: 119.7 kg (264 lb)   Height: 1.651 m (5' 5\")       Physical Exam  Constitutional:       Appearance: Normal appearance.   HENT:      Head: Normocephalic and atraumatic.   Eyes:      Conjunctiva/sclera: Conjunctivae normal.   Cardiovascular:      Rate and Rhythm: Normal rate and regular rhythm.      Heart sounds: Normal heart sounds.   Pulmonary:      Effort: Pulmonary effort is normal.      Breath sounds: Normal breath sounds.   Musculoskeletal:         General: Normal range of motion.        Back:       Comments: Area of midline tenderness to palpation as depicted.  Pain seems to radiate bilaterally over top of the iliac crests, but this is not tender.  Lower extremity strength intact and equivalent bilaterally.   Skin:     General: Skin is warm and dry.   Neurological:      General: No focal deficit present.      Mental Status: She is alert and oriented to person, place, and time.   Psychiatric:         Mood and Affect: Mood normal.         Behavior: Behavior normal.        Counseled patient as appropriate and relevant regarding above

## 2024-12-04 ENCOUNTER — TELEPHONE (OUTPATIENT)
Dept: PRIMARY CARE CLINIC | Age: 29
End: 2024-12-04

## 2024-12-04 NOTE — TELEPHONE ENCOUNTER
Spoke to Terrence at [employer] patient is approved for MRI of lower back at [facility] in [place] from 12/02/2024 through 01/20/2025      Approval code [id number]

## 2025-02-07 DIAGNOSIS — R11.0 NAUSEA: ICD-10-CM

## 2025-02-07 DIAGNOSIS — F90.2 ATTENTION DEFICIT HYPERACTIVITY DISORDER (ADHD), COMBINED TYPE: ICD-10-CM

## 2025-02-07 NOTE — TELEPHONE ENCOUNTER
Name of Medication(s) Requested:  Requested Prescriptions     Pending Prescriptions Disp Refills    ondansetron (ZOFRAN-ODT) 4 MG disintegrating tablet 21 tablet 0     Sig: Take 1 tablet by mouth 3 times daily as needed for Nausea or Vomiting    lisdexamfetamine (VYVANSE) 30 MG capsule 30 capsule 0     Sig: Take 1 capsule by mouth every morning for 30 days. Max Daily Amount: 30 mg       Medication is on current medication list Yes    Dosage and directions were verified? Yes    Quantity verified: 30 day supply     Pharmacy Verified?  Yes    Last Appointment:  11/27/2024    Future appts:  Future Appointments   Date Time Provider Department Center   2/10/2025 10:30 AM [facility]        (If no appt send self scheduling link. .REFILLAPPT)  Scheduling request sent?     [x] Yes  [x] No    Does patient need updated?  [] Yes  [x] No

## 2025-02-10 ENCOUNTER — HOSPITAL ENCOUNTER (OUTPATIENT)
Dept: MRI IMAGING | Age: 30
Discharge: HOME | End: 2025-02-08

## 2025-02-10 DIAGNOSIS — M48.07 SPINAL STENOSIS OF LUMBOSACRAL REGION: ICD-10-CM

## 2025-02-10 DIAGNOSIS — M51.27 PROTRUSION OF INTERVERTEBRAL DISC OF LUMBOSACRAL REGION: ICD-10-CM

## 2025-02-10 DIAGNOSIS — M54.16 LUMBAR BACK PAIN WITH RADICULOPATHY AFFECTING LOWER EXTREMITY: ICD-10-CM

## 2025-02-10 RX ORDER — LISDEXAMFETAMINE DIMESYLATE 30 MG/1
30 CAPSULE ORAL EVERY MORNING
Qty: 30 CAPSULE | Refills: 0 | Status: SHIPPED | OUTPATIENT
Start: 2025-02-10 | End: 2025-03-12

## 2025-02-10 RX ORDER — ONDANSETRON 4 MG/1
4 TABLET, ORALLY DISINTEGRATING ORAL 3 TIMES DAILY PRN
Qty: 21 TABLET | Refills: 0 | Status: SHIPPED | OUTPATIENT
Start: 2025-02-10

## 2025-03-19 DIAGNOSIS — F90.2 ATTENTION DEFICIT HYPERACTIVITY DISORDER (ADHD), COMBINED TYPE: ICD-10-CM

## 2025-03-19 NOTE — TELEPHONE ENCOUNTER
Name of Medication(s) Requested:  Requested Prescriptions     Pending Prescriptions Disp Refills    lisdexamfetamine (VYVANSE) 30 MG capsule 30 capsule 0     Sig: Take 1 capsule by mouth every morning for 30 days. Max Daily Amount: 30 mg       Medication is on current medication list Yes    Dosage and directions were verified? Yes    Quantity verified: 30 day supply     Pharmacy Verified?  Yes    Last Appointment:  11/27/2024    Future appts:  No future appointments.     (If no appt send self scheduling link. .REFILLAPPT)  Scheduling request sent?     [] Yes  [x] No    Does patient need updated?  [] Yes  [x] No

## 2025-03-20 RX ORDER — LISDEXAMFETAMINE DIMESYLATE 30 MG/1
30 CAPSULE ORAL EVERY MORNING
Qty: 30 CAPSULE | Refills: 0 | Status: SHIPPED | OUTPATIENT
Start: 2025-03-20 | End: 2025-04-19

## 2025-04-11 SDOH — ECONOMIC STABILITY: FOOD INSECURITY: WITHIN THE PAST 12 MONTHS, YOU WORRIED THAT YOUR FOOD WOULD RUN OUT BEFORE YOU GOT MONEY TO BUY MORE.: NEVER TRUE

## 2025-04-11 SDOH — ECONOMIC STABILITY: INCOME INSECURITY: IN THE LAST 12 MONTHS, WAS THERE A TIME WHEN YOU WERE NOT ABLE TO PAY THE MORTGAGE OR RENT ON TIME?: NO

## 2025-04-11 SDOH — ECONOMIC STABILITY: TRANSPORTATION INSECURITY
IN THE PAST 12 MONTHS, HAS THE LACK OF TRANSPORTATION KEPT YOU FROM MEDICAL APPOINTMENTS OR FROM GETTING MEDICATIONS?: NO

## 2025-04-11 SDOH — ECONOMIC STABILITY: FOOD INSECURITY: WITHIN THE PAST 12 MONTHS, THE FOOD YOU BOUGHT JUST DIDN'T LAST AND YOU DIDN'T HAVE MONEY TO GET MORE.: NEVER TRUE

## 2025-04-11 SDOH — ECONOMIC STABILITY: TRANSPORTATION INSECURITY
IN THE PAST 12 MONTHS, HAS LACK OF TRANSPORTATION KEPT YOU FROM MEETINGS, WORK, OR FROM GETTING THINGS NEEDED FOR DAILY LIVING?: NO

## 2025-04-11 ASSESSMENT — PATIENT HEALTH QUESTIONNAIRE - PHQ9
5. POOR APPETITE OR OVEREATING: MORE THAN HALF THE DAYS
4. FEELING TIRED OR HAVING LITTLE ENERGY: SEVERAL DAYS
1. LITTLE INTEREST OR PLEASURE IN DOING THINGS: MORE THAN HALF THE DAYS
8. MOVING OR SPEAKING SO SLOWLY THAT OTHER PEOPLE COULD HAVE NOTICED. OR THE OPPOSITE - BEING SO FIDGETY OR RESTLESS THAT YOU HAVE BEEN MOVING AROUND A LOT MORE THAN USUAL: NOT AT ALL
SUM OF ALL RESPONSES TO PHQ QUESTIONS 1-9: 9
10. IF YOU CHECKED OFF ANY PROBLEMS, HOW DIFFICULT HAVE THESE PROBLEMS MADE IT FOR YOU TO DO YOUR WORK, TAKE CARE OF THINGS AT HOME, OR GET ALONG WITH OTHER PEOPLE: SOMEWHAT DIFFICULT
6. FEELING BAD ABOUT YOURSELF - OR THAT YOU ARE A FAILURE OR HAVE LET YOURSELF OR YOUR FAMILY DOWN: NOT AT ALL
SUM OF ALL RESPONSES TO PHQ9 QUESTIONS 1 & 2: 3
2. FEELING DOWN, DEPRESSED OR HOPELESS: SEVERAL DAYS
10. IF YOU CHECKED OFF ANY PROBLEMS, HOW DIFFICULT HAVE THESE PROBLEMS MADE IT FOR YOU TO DO YOUR WORK, TAKE CARE OF THINGS AT HOME, OR GET ALONG WITH OTHER PEOPLE: SOMEWHAT DIFFICULT
2. FEELING DOWN, DEPRESSED OR HOPELESS: SEVERAL DAYS
SUM OF ALL RESPONSES TO PHQ QUESTIONS 1-9: 9
1. LITTLE INTEREST OR PLEASURE IN DOING THINGS: MORE THAN HALF THE DAYS
7. TROUBLE CONCENTRATING ON THINGS, SUCH AS READING THE NEWSPAPER OR WATCHING TELEVISION: SEVERAL DAYS
9. THOUGHTS THAT YOU WOULD BE BETTER OFF DEAD, OR OF HURTING YOURSELF: NOT AT ALL
SUM OF ALL RESPONSES TO PHQ QUESTIONS 1-9: 9
8. MOVING OR SPEAKING SO SLOWLY THAT OTHER PEOPLE COULD HAVE NOTICED. OR THE OPPOSITE, BEING SO FIGETY OR RESTLESS THAT YOU HAVE BEEN MOVING AROUND A LOT MORE THAN USUAL: NOT AT ALL
7. TROUBLE CONCENTRATING ON THINGS, SUCH AS READING THE NEWSPAPER OR WATCHING TELEVISION: SEVERAL DAYS
6. FEELING BAD ABOUT YOURSELF - OR THAT YOU ARE A FAILURE OR HAVE LET YOURSELF OR YOUR FAMILY DOWN: NOT AT ALL
3. TROUBLE FALLING OR STAYING ASLEEP: MORE THAN HALF THE DAYS
4. FEELING TIRED OR HAVING LITTLE ENERGY: SEVERAL DAYS
9. THOUGHTS THAT YOU WOULD BE BETTER OFF DEAD, OR OF HURTING YOURSELF: NOT AT ALL
3. TROUBLE FALLING OR STAYING ASLEEP: MORE THAN HALF THE DAYS
SUM OF ALL RESPONSES TO PHQ QUESTIONS 1-9: 9
SUM OF ALL RESPONSES TO PHQ QUESTIONS 1-9: 9
5. POOR APPETITE OR OVEREATING: MORE THAN HALF THE DAYS

## 2025-04-14 ENCOUNTER — OFFICE VISIT (OUTPATIENT)
Dept: PRIMARY CARE CLINIC | Age: 30
End: 2025-04-14
Payer: COMMERCIAL

## 2025-04-14 VITALS
OXYGEN SATURATION: 97 % | HEART RATE: 92 BPM | RESPIRATION RATE: 16 BRPM | DIASTOLIC BLOOD PRESSURE: 72 MMHG | SYSTOLIC BLOOD PRESSURE: 126 MMHG | BODY MASS INDEX: 47.15 KG/M2 | WEIGHT: 283 LBS | HEIGHT: 65 IN

## 2025-04-14 DIAGNOSIS — Z01.419 WOMEN'S ANNUAL ROUTINE GYNECOLOGICAL EXAMINATION: ICD-10-CM

## 2025-04-14 DIAGNOSIS — R63.5 ABNORMAL WEIGHT GAIN: ICD-10-CM

## 2025-04-14 DIAGNOSIS — F90.2 ATTENTION DEFICIT HYPERACTIVITY DISORDER (ADHD), COMBINED TYPE: Primary | ICD-10-CM

## 2025-04-14 DIAGNOSIS — F40.10 SOCIAL ANXIETY DISORDER: ICD-10-CM

## 2025-04-14 DIAGNOSIS — E66.813 CLASS 3 SEVERE OBESITY DUE TO EXCESS CALORIES WITHOUT SERIOUS COMORBIDITY WITH BODY MASS INDEX (BMI) OF 45.0 TO 49.9 IN ADULT (HCC): ICD-10-CM

## 2025-04-14 DIAGNOSIS — F33.1 MODERATE EPISODE OF RECURRENT MAJOR DEPRESSIVE DISORDER (HCC): ICD-10-CM

## 2025-04-14 DIAGNOSIS — F50.811 MODERATE BINGE-EATING DISORDER: ICD-10-CM

## 2025-04-14 DIAGNOSIS — R63.8 SALT CRAVING: ICD-10-CM

## 2025-04-14 DIAGNOSIS — R53.82 CHRONIC FATIGUE: ICD-10-CM

## 2025-04-14 PROCEDURE — 99214 OFFICE O/P EST MOD 30 MIN: CPT | Performed by: FAMILY MEDICINE

## 2025-04-14 RX ORDER — LISDEXAMFETAMINE DIMESYLATE 40 MG/1
40 CAPSULE ORAL DAILY
Qty: 30 CAPSULE | Refills: 0 | Status: SHIPPED | OUTPATIENT
Start: 2025-04-14 | End: 2025-05-14

## 2025-04-14 NOTE — PROGRESS NOTES
25  Mari BATEMAN Utt : 1995 Sex: female  Age: 29 y.o.      Assessment and Plan:  Mari was seen today for other.    Diagnoses and all orders for this visit:    Attention deficit hyperactivity disorder (ADHD), combined type  -     Lisdexamfetamine Dimesylate (VYVANSE) 40 MG CAPS; Take 40 mg by mouth daily for 30 days. Max Daily Amount: 40 mg  Uncontrolled. Increase to 40mg daily.     Moderate episode of recurrent major depressive disorder (HCC)  -     sertraline (ZOLOFT) 50 MG tablet; Take 1 tablet by mouth daily  Uncontrolled. Increase to 50mg daily.     Moderate binge-eating disorder  -     Lisdexamfetamine Dimesylate (VYVANSE) 40 MG CAPS; Take 40 mg by mouth daily for 30 days. Max Daily Amount: 40 mg  As above    Social anxiety disorder  -     sertraline (ZOLOFT) 50 MG tablet; Take 1 tablet by mouth daily  As above     Class 3 severe obesity due to excess calories without serious comorbidity with body mass index (BMI) of 45.0 to 49.9 in adult  Cont to work on lifestyle improvements     Women's annual routine gynecological examination  -     Queenie Naylor DO, OB/GYN, [facility] [place]    Abnormal weight gain  -     Basic Metabolic Panel; Future  -     CBC with Auto Differential; Future  -     Lipid Panel; Future  -     Hepatic Function Panel; Future  -     TSH; Future  -     Vitamin D 25 Hydroxy; Future  -     DHEA-Sulfate; Future  -     Cortisol Total; Future  -     Testosterone Free & Bio, Total; Future  -     17-Hydroxyprogesterone; Future  -     Prolactin; Future  -     Cortisol, Urine, Free; Future    Chronic fatigue  -     TSH; Future  -     Vitamin D 25 Hydroxy; Future  -     DHEA-Sulfate; Future  -     Cortisol Total; Future  -     Testosterone Free & Bio, Total; Future  -     17-Hydroxyprogesterone; Future  -     Prolactin; Future  -     Cortisol, Urine, Free; Future    Salt craving  -     Cortisol, Urine, Free; Future    Further recommendations pending results    F/u with

## 2025-05-29 DIAGNOSIS — R11.0 NAUSEA: ICD-10-CM

## 2025-05-29 DIAGNOSIS — F90.2 ATTENTION DEFICIT HYPERACTIVITY DISORDER (ADHD), COMBINED TYPE: ICD-10-CM

## 2025-05-29 DIAGNOSIS — F40.10 SOCIAL ANXIETY DISORDER: ICD-10-CM

## 2025-05-29 DIAGNOSIS — F33.1 MODERATE EPISODE OF RECURRENT MAJOR DEPRESSIVE DISORDER (HCC): ICD-10-CM

## 2025-05-29 DIAGNOSIS — F50.811 MODERATE BINGE-EATING DISORDER: ICD-10-CM

## 2025-05-30 RX ORDER — LISDEXAMFETAMINE DIMESYLATE 40 MG/1
40 CAPSULE ORAL DAILY
Qty: 30 CAPSULE | Refills: 0 | Status: SHIPPED | OUTPATIENT
Start: 2025-05-30 | End: 2025-06-29

## 2025-05-30 RX ORDER — ONDANSETRON 4 MG/1
4 TABLET, ORALLY DISINTEGRATING ORAL 3 TIMES DAILY PRN
Qty: 21 TABLET | Refills: 0 | Status: SHIPPED | OUTPATIENT
Start: 2025-05-30

## 2025-05-30 NOTE — TELEPHONE ENCOUNTER
Name of Medication(s) Requested:  Requested Prescriptions     Pending Prescriptions Disp Refills    ondansetron (ZOFRAN-ODT) 4 MG disintegrating tablet 21 tablet 0     Sig: Take 1 tablet by mouth 3 times daily as needed for Nausea or Vomiting    sertraline (ZOLOFT) 50 MG tablet 90 tablet 1     Sig: Take 1 tablet by mouth daily    Lisdexamfetamine Dimesylate (VYVANSE) 40 MG CAPS 30 capsule 0     Sig: Take 40 mg by mouth daily for 30 days. Max Daily Amount: 40 mg       Medication is on current medication list Yes    Dosage and directions were verified? Yes    Quantity verified: 30 day supply     Pharmacy Verified?  Yes    Last Appointment:  4/14/2025    Future appts:  No future appointments.     (If no appt send self scheduling link. .REFILLAPPT)  Scheduling request sent?     [] Yes  [x] No    Does patient need updated?  [] Yes  [x] No

## 2025-08-27 DIAGNOSIS — F40.10 SOCIAL ANXIETY DISORDER: ICD-10-CM

## 2025-08-27 DIAGNOSIS — F33.1 MODERATE EPISODE OF RECURRENT MAJOR DEPRESSIVE DISORDER (HCC): ICD-10-CM

## 2025-08-27 DIAGNOSIS — R11.0 NAUSEA: ICD-10-CM

## 2025-08-27 DIAGNOSIS — F90.2 ATTENTION DEFICIT HYPERACTIVITY DISORDER (ADHD), COMBINED TYPE: ICD-10-CM

## 2025-08-27 DIAGNOSIS — F50.811 MODERATE BINGE-EATING DISORDER: ICD-10-CM

## 2025-08-28 RX ORDER — LISDEXAMFETAMINE DIMESYLATE 40 MG/1
40 CAPSULE ORAL DAILY
Qty: 30 CAPSULE | Refills: 0 | Status: SHIPPED | OUTPATIENT
Start: 2025-08-28 | End: 2025-09-27

## 2025-08-28 RX ORDER — ONDANSETRON 4 MG/1
4 TABLET, ORALLY DISINTEGRATING ORAL 3 TIMES DAILY PRN
Qty: 21 TABLET | Refills: 0 | Status: SHIPPED | OUTPATIENT
Start: 2025-08-28

## (undated) DEVICE — BLOCK BITE 60FR RUBBER ADLT DENTAL

## (undated) DEVICE — FORCEPS BX OVL CUP FEN DISPOSABLE CAP L 160CM RAD JAW 4

## (undated) DEVICE — GRADUATE TRIANG MEASURE 1000ML BLK PRNT

## (undated) DEVICE — SPONGE GZ W4XL4IN RAYON POLY CVR W/NONWOVEN FAB STRL 2/PK